# Patient Record
Sex: FEMALE | Race: BLACK OR AFRICAN AMERICAN | NOT HISPANIC OR LATINO
[De-identification: names, ages, dates, MRNs, and addresses within clinical notes are randomized per-mention and may not be internally consistent; named-entity substitution may affect disease eponyms.]

---

## 2022-08-25 ENCOUNTER — APPOINTMENT (OUTPATIENT)
Dept: ORTHOPEDIC SURGERY | Facility: CLINIC | Age: 62
End: 2022-08-25

## 2022-08-25 VITALS — WEIGHT: 200 LBS | BODY MASS INDEX: 30.31 KG/M2 | HEIGHT: 68 IN

## 2022-08-25 DIAGNOSIS — S76.012A STRAIN OF MUSCLE, FASCIA AND TENDON OF LEFT HIP, INITIAL ENCOUNTER: ICD-10-CM

## 2022-08-25 DIAGNOSIS — E11.9 TYPE 2 DIABETES MELLITUS W/OUT COMPLICATIONS: ICD-10-CM

## 2022-08-25 DIAGNOSIS — M23.92 UNSPECIFIED INTERNAL DERANGEMENT OF LEFT KNEE: ICD-10-CM

## 2022-08-25 DIAGNOSIS — I10 ESSENTIAL (PRIMARY) HYPERTENSION: ICD-10-CM

## 2022-08-25 PROBLEM — Z00.00 ENCOUNTER FOR PREVENTIVE HEALTH EXAMINATION: Status: ACTIVE | Noted: 2022-08-25

## 2022-08-25 PROCEDURE — 73502 X-RAY EXAM HIP UNI 2-3 VIEWS: CPT | Mod: LT

## 2022-08-25 PROCEDURE — 99204 OFFICE O/P NEW MOD 45 MIN: CPT

## 2022-08-25 PROCEDURE — 73564 X-RAY EXAM KNEE 4 OR MORE: CPT | Mod: 50

## 2022-08-25 RX ORDER — METFORMIN HYDROCHLORIDE 625 MG/1
TABLET ORAL
Refills: 0 | Status: ACTIVE | COMMUNITY

## 2022-08-25 RX ORDER — TELMISARTAN 20 MG/1
TABLET ORAL
Refills: 0 | Status: ACTIVE | COMMUNITY

## 2022-08-25 RX ORDER — INSULIN GLARGINE 100 [IU]/ML
INJECTION, SOLUTION SUBCUTANEOUS
Refills: 0 | Status: ACTIVE | COMMUNITY
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
## 2022-08-25 NOTE — PHYSICAL EXAM
[Left] : left hip [AP] : anteroposterior [Lateral] : lateral [There are no fractures, subluxations or dislocations. No significant abnormalities are seen] : There are no fractures, subluxations or dislocations. No significant abnormalities are seen [Mild arthritis (Tonnis Grade 1)] : Mild arthritis (Tonnis Grade 1) [5___] : hamstring 5[unfilled]/5 [Bilateral] : knee bilaterally [All Views] : anteroposterior, lateral, skyline, and anteroposterior standing [Mild tricompartmental OA medial narrowing] : Mild tricompartmental OA medial narrowing [] : no erythema
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
## 2022-08-30 ENCOUNTER — APPOINTMENT (OUTPATIENT)
Dept: MRI IMAGING | Facility: CLINIC | Age: 62
End: 2022-08-30

## 2022-08-31 ENCOUNTER — FORM ENCOUNTER (OUTPATIENT)
Age: 62
End: 2022-08-31

## 2022-09-01 ENCOUNTER — APPOINTMENT (OUTPATIENT)
Dept: MRI IMAGING | Facility: CLINIC | Age: 62
End: 2022-09-01

## 2022-09-01 PROCEDURE — 73721 MRI JNT OF LWR EXTRE W/O DYE: CPT | Mod: LT

## 2022-09-08 ENCOUNTER — APPOINTMENT (OUTPATIENT)
Dept: ORTHOPEDIC SURGERY | Facility: CLINIC | Age: 62
End: 2022-09-08

## 2022-09-08 DIAGNOSIS — M17.12 UNILATERAL PRIMARY OSTEOARTHRITIS, LEFT KNEE: ICD-10-CM

## 2022-09-08 DIAGNOSIS — M16.12 UNILATERAL PRIMARY OSTEOARTHRITIS, LEFT HIP: ICD-10-CM

## 2022-09-08 PROCEDURE — 99214 OFFICE O/P EST MOD 30 MIN: CPT

## 2022-09-08 RX ORDER — DICLOFENAC SODIUM 75 MG/1
75 TABLET, DELAYED RELEASE ORAL TWICE DAILY
Qty: 60 | Refills: 1 | Status: COMPLETED | COMMUNITY
Start: 2022-09-08 | End: 2022-11-07
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
## 2022-09-08 NOTE — PHYSICAL EXAM
[Left] : left hip [AP] : anteroposterior [Lateral] : lateral [There are no fractures, subluxations or dislocations. No significant abnormalities are seen] : There are no fractures, subluxations or dislocations. No significant abnormalities are seen [Mild arthritis (Tonnis Grade 1)] : Mild arthritis (Tonnis Grade 1) [5___] : hamstring 5[unfilled]/5 [] : ligamentously stable [Bilateral] : knee bilaterally [All Views] : anteroposterior, lateral, skyline, and anteroposterior standing [Mild tricompartmental OA medial narrowing] : Mild tricompartmental OA medial narrowing

## 2022-09-08 NOTE — ASSESSMENT
[FreeTextEntry1] : REVIEWED MRI KNEE AND HIP\par MOSTLY OA ISSUE\par SHE IS DIABETIC, WANTS TO AVOID CSI\par RX FOR DICLOFENAC\par EXPLAINED CHRONIC ISSUE AND WILL LIKELY REQUIRE TREATMENT FROM TIME TO TIME\par HEP AND NSAIDS PRN\par \par